# Patient Record
Sex: FEMALE | Race: WHITE | NOT HISPANIC OR LATINO | Employment: OTHER | URBAN - METROPOLITAN AREA
[De-identification: names, ages, dates, MRNs, and addresses within clinical notes are randomized per-mention and may not be internally consistent; named-entity substitution may affect disease eponyms.]

---

## 2023-03-06 DIAGNOSIS — B00.1 COLD SORE: ICD-10-CM

## 2023-03-06 RX ORDER — VALACYCLOVIR HYDROCHLORIDE 1 G/1
2 TABLET, FILM COATED ORAL EVERY 12 HOURS
COMMUNITY
Start: 2022-07-08 | End: 2023-03-06 | Stop reason: SDUPTHER

## 2023-03-06 RX ORDER — VALACYCLOVIR HYDROCHLORIDE 1 G/1
2000 TABLET, FILM COATED ORAL EVERY 12 HOURS
Qty: 12 TABLET | Refills: 1 | Status: SHIPPED | OUTPATIENT
Start: 2023-03-06 | End: 2023-04-26 | Stop reason: SDUPTHER

## 2023-04-04 PROBLEM — B00.1 COLD SORE: Status: ACTIVE | Noted: 2023-04-04

## 2023-04-04 PROBLEM — M06.9 RHEUMATOID ARTHRITIS (MULTI): Status: ACTIVE | Noted: 2023-04-04

## 2023-04-04 PROBLEM — I10 HYPERTENSION, ESSENTIAL: Status: ACTIVE | Noted: 2023-04-04

## 2023-04-04 PROBLEM — E78.5 DYSLIPIDEMIA: Status: ACTIVE | Noted: 2023-04-04

## 2023-04-04 PROBLEM — C85.90 LYMPHOMA (MULTI): Status: ACTIVE | Noted: 2023-04-04

## 2023-04-04 PROBLEM — R00.2 PALPITATIONS: Status: ACTIVE | Noted: 2023-04-04

## 2023-04-04 PROBLEM — E03.8 SUBCLINICAL HYPOTHYROIDISM: Status: ACTIVE | Noted: 2023-04-04

## 2023-04-18 ENCOUNTER — TELEPHONE (OUTPATIENT)
Dept: PRIMARY CARE | Facility: CLINIC | Age: 63
End: 2023-04-18
Payer: MEDICARE

## 2023-04-18 NOTE — TELEPHONE ENCOUNTER
Milla was treated for a URI the first of the month and was placed on Augmentin she started with diarrhea on the 12th and even went to [facility] on Sunday for the diarrhea.   They checked her for c  diff which was negative.  She still has the diarrhea and asking for something for that.

## 2023-04-25 RX ORDER — TRIAMCINOLONE ACETONIDE 1 MG/G
1 CREAM TOPICAL 2 TIMES DAILY
COMMUNITY
Start: 2023-01-12

## 2023-04-25 RX ORDER — METRONIDAZOLE 10 MG/G
1 GEL TOPICAL DAILY
COMMUNITY
Start: 2023-01-12

## 2023-04-26 ENCOUNTER — OFFICE VISIT (OUTPATIENT)
Dept: PRIMARY CARE | Facility: CLINIC | Age: 63
End: 2023-04-26
Payer: MEDICARE

## 2023-04-26 VITALS
BODY MASS INDEX: 25.52 KG/M2 | HEIGHT: 66 IN | HEART RATE: 80 BPM | WEIGHT: 158.8 LBS | DIASTOLIC BLOOD PRESSURE: 82 MMHG | SYSTOLIC BLOOD PRESSURE: 144 MMHG

## 2023-04-26 DIAGNOSIS — C85.90 NON-HODGKIN'S LYMPHOMA, UNSPECIFIED BODY REGION, UNSPECIFIED NON-HODGKIN LYMPHOMA TYPE (MULTI): ICD-10-CM

## 2023-04-26 DIAGNOSIS — B00.1 COLD SORE: ICD-10-CM

## 2023-04-26 DIAGNOSIS — I10 HYPERTENSION, ESSENTIAL: Primary | ICD-10-CM

## 2023-04-26 DIAGNOSIS — M06.9 RHEUMATOID ARTHRITIS, INVOLVING UNSPECIFIED SITE, UNSPECIFIED WHETHER RHEUMATOID FACTOR PRESENT (MULTI): ICD-10-CM

## 2023-04-26 DIAGNOSIS — E78.5 DYSLIPIDEMIA: ICD-10-CM

## 2023-04-26 DIAGNOSIS — E03.8 SUBCLINICAL HYPOTHYROIDISM: ICD-10-CM

## 2023-04-26 PROCEDURE — 3077F SYST BP >= 140 MM HG: CPT | Performed by: STUDENT IN AN ORGANIZED HEALTH CARE EDUCATION/TRAINING PROGRAM

## 2023-04-26 PROCEDURE — 99214 OFFICE O/P EST MOD 30 MIN: CPT | Performed by: STUDENT IN AN ORGANIZED HEALTH CARE EDUCATION/TRAINING PROGRAM

## 2023-04-26 PROCEDURE — 1036F TOBACCO NON-USER: CPT | Performed by: STUDENT IN AN ORGANIZED HEALTH CARE EDUCATION/TRAINING PROGRAM

## 2023-04-26 PROCEDURE — 3079F DIAST BP 80-89 MM HG: CPT | Performed by: STUDENT IN AN ORGANIZED HEALTH CARE EDUCATION/TRAINING PROGRAM

## 2023-04-26 RX ORDER — LOSARTAN POTASSIUM 50 MG/1
50 TABLET ORAL DAILY
Qty: 30 TABLET | Refills: 11 | Status: SHIPPED | OUTPATIENT
Start: 2023-04-26 | End: 2023-10-25 | Stop reason: ALTCHOICE

## 2023-04-26 RX ORDER — VALACYCLOVIR HYDROCHLORIDE 1 G/1
2000 TABLET, FILM COATED ORAL EVERY 12 HOURS
Qty: 12 TABLET | Refills: 1 | Status: SHIPPED | OUTPATIENT
Start: 2023-04-26 | End: 2023-07-25 | Stop reason: SDUPTHER

## 2023-04-26 NOTE — PROGRESS NOTES
"3 month check. Had an uri last month, urgent care given antibiotic. Caused diarrhea for 11 days. Still coughing some green phlegm in the morning.    Subjective   Patient ID: Milla Wild is a 62 y.o. female who presents for Hypertension, Hyperlipidemia, Rheumatoid Arthritis, and Lymphoma.    HPI  Regarding URI, evaluated at Urgent Care 4/6/2023 and provided with rx for Augmentin/Flonase/Tessalon. Unable to complete course of Augmentin due to significant diarrhea prompting ED eval. Tested for c diff twice (ED, oncology) - both negative. Just now starting to feel improved from UTI and diarrhea standpoint. Feeling like her energy and strength is starting to improve. Still struggling some with intermittently productive cough, but overall feeling better.    Regarding lymphoma, states that she just recently had updated scans at [facility]. Brain scan not done, but otherwise looked good per pt report. Next oncology eval scheduled for 7/13/2023.    Regarding HTN, SBP has been averaging 150s at home.    Review of Systems   Constitutional:  Negative for chills and fever.   Respiratory:  Positive for cough. Negative for shortness of breath.    Cardiovascular:  Negative for chest pain and palpitations.   Skin:  Negative for rash.   Psychiatric/Behavioral:  Negative for dysphoric mood. The patient is not nervous/anxious.      Objective   /82   Pulse 80   Ht 1.676 m (5' 6\")   Wt 72 kg (158 lb 12.8 oz)   BMI 25.63 kg/m²     Physical Exam  Constitutional:       Appearance: Normal appearance.   HENT:      Head: Normocephalic and atraumatic.   Eyes:      General: No scleral icterus.     Conjunctiva/sclera: Conjunctivae normal.   Cardiovascular:      Rate and Rhythm: Normal rate and regular rhythm.      Heart sounds: No murmur heard.  Pulmonary:      Effort: Pulmonary effort is normal. No respiratory distress.      Breath sounds: Normal breath sounds.   Musculoskeletal:         General: No swelling. Normal range of " motion.      Cervical back: Normal range of motion and neck supple.   Skin:     General: Skin is warm and dry.      Findings: No rash.   Neurological:      General: No focal deficit present.      Mental Status: She is alert.   Psychiatric:         Mood and Affect: Mood normal.         Behavior: Behavior normal.       Assessment/Plan   Problem List Items Addressed This Visit       Subclinical hypothyroidism     Labs prior to next appt.         Relevant Orders    TSH with reflex to Free T4 if abnormal    Rheumatoid arthritis (CMS/HCC)     Stable.         Non-Hodgkin's lymphoma (CMS/HCC)     Continue close follow up with oncology as previously scheduled.         Hypertension, essential - Primary     BP remains above goal. Will increase losartan to 50mg every day. Medication dosing and side effects reviewed. She will return in 2wks for nurse BP check and repeat BMP. Follow up in 6mo for recheck, sooner if needed. Annual labs prior to appt.         Relevant Medications    losartan (Cozaar) 50 mg tablet    Other Relevant Orders    Basic metabolic panel    CBC and Auto Differential    Comprehensive Metabolic Panel    Dyslipidemia     Labs prior to next appt.         Relevant Orders    Lipid Panel    Cold sore     Valtrex refill provided.         Relevant Medications    valACYclovir (Valtrex) 1 gram tablet

## 2023-05-01 PROBLEM — M85.80 OSTEOPENIA: Status: ACTIVE | Noted: 2023-05-01

## 2023-05-01 ASSESSMENT — ENCOUNTER SYMPTOMS
DYSPHORIC MOOD: 0
FEVER: 0
COUGH: 1
PALPITATIONS: 0
NERVOUS/ANXIOUS: 0
SHORTNESS OF BREATH: 0
CHILLS: 0

## 2023-05-01 NOTE — ASSESSMENT & PLAN NOTE
BP remains above goal. Will increase losartan to 50mg every day. Medication dosing and side effects reviewed. She will return in 2wks for nurse BP check and repeat BMP. Follow up in 6mo for recheck, sooner if needed. Annual labs prior to appt.

## 2023-05-17 ENCOUNTER — TELEPHONE (OUTPATIENT)
Dept: PRIMARY CARE | Facility: CLINIC | Age: 63
End: 2023-05-17
Payer: MEDICARE

## 2023-05-17 NOTE — TELEPHONE ENCOUNTER
Pt notified. Said she hasn't been taking it lately, has been like 116/60s-70s. No dizziness or anything.

## 2023-05-17 NOTE — TELEPHONE ENCOUNTER
----- Message from Laura Momin DO sent at 5/17/2023  1:02 PM EDT -----  Please let patient know that her kidney numbers still look great with the higher dose of losartan. How's her BP been at home? Thank you

## 2023-07-25 DIAGNOSIS — B00.1 COLD SORE: ICD-10-CM

## 2023-07-25 RX ORDER — VALACYCLOVIR HYDROCHLORIDE 1 G/1
2000 TABLET, FILM COATED ORAL EVERY 12 HOURS
Qty: 12 TABLET | Refills: 1 | Status: SHIPPED | OUTPATIENT
Start: 2023-07-25 | End: 2023-08-25 | Stop reason: SDUPTHER

## 2023-08-02 ENCOUNTER — OFFICE VISIT (OUTPATIENT)
Dept: PRIMARY CARE | Facility: CLINIC | Age: 63
End: 2023-08-02
Payer: MEDICARE

## 2023-08-02 VITALS
HEART RATE: 96 BPM | HEIGHT: 66 IN | BODY MASS INDEX: 25.63 KG/M2 | OXYGEN SATURATION: 99 % | SYSTOLIC BLOOD PRESSURE: 140 MMHG | DIASTOLIC BLOOD PRESSURE: 74 MMHG

## 2023-08-02 DIAGNOSIS — M54.50 BACK PAIN, LUMBOSACRAL: Primary | ICD-10-CM

## 2023-08-02 PROCEDURE — 99213 OFFICE O/P EST LOW 20 MIN: CPT | Performed by: INTERNAL MEDICINE

## 2023-08-02 PROCEDURE — 3077F SYST BP >= 140 MM HG: CPT | Performed by: INTERNAL MEDICINE

## 2023-08-02 PROCEDURE — 1036F TOBACCO NON-USER: CPT | Performed by: INTERNAL MEDICINE

## 2023-08-02 PROCEDURE — 3078F DIAST BP <80 MM HG: CPT | Performed by: INTERNAL MEDICINE

## 2023-08-02 RX ORDER — NAPROXEN 500 MG/1
500 TABLET ORAL
Qty: 12 TABLET | Refills: 0 | Status: SHIPPED | OUTPATIENT
Start: 2023-08-02 | End: 2023-10-25 | Stop reason: ALTCHOICE

## 2023-08-02 RX ORDER — BACLOFEN 10 MG/1
10 TABLET ORAL 3 TIMES DAILY
Qty: 30 TABLET | Refills: 1 | Status: SHIPPED | OUTPATIENT
Start: 2023-08-02 | End: 2023-10-25 | Stop reason: ALTCHOICE

## 2023-08-02 ASSESSMENT — ENCOUNTER SYMPTOMS
FEVER: 0
NAUSEA: 0
PALPITATIONS: 0
ARTHRALGIAS: 0
FATIGUE: 0
SHORTNESS OF BREATH: 0
COUGH: 0
BACK PAIN: 0
HEMATURIA: 0
DYSURIA: 0
FLANK PAIN: 0
BLOOD IN STOOL: 0
VOMITING: 0
DIARRHEA: 0
ABDOMINAL PAIN: 0
WHEEZING: 0

## 2023-08-02 NOTE — PROGRESS NOTES
"Subjective   Patient ID: Milla Wild is a 62 y.o. female who presents for No chief complaint on file..  HPI  I am seeing her during Dr. Momin's absence  She is here today for evaluation of acute left lower back pain.  She states that yesterday was simply turning she felt an abrupt onset of some \"stabbing pain \"in her back.  She states that she recalls having a very similar incident about 20 years ago and in that situation her stabbing pain was quite severe.  She states she recalls taking an anti-inflammatory and muscle relaxer and it did spontaneously resolve.  We did conduct a review of systems and she is having no systemic symptoms.  She also does not have any symptoms of numbness, tingling, or weakness of her lower extremity.  She has no symptoms of bowel or bladder incontinence.  On today's examination her deep tendon reflexes are brisk and symmetrical.  I decided to treat her with the anti-inflammatory and a muscle relaxer.  We talked about potential side effects and what to watch out for.  She will try this for 10 days and she knows to call if her condition is worsening as opposed to resolving.  Review of Systems   Constitutional:  Negative for fatigue and fever.   Respiratory:  Negative for cough, shortness of breath and wheezing.    Cardiovascular:  Negative for chest pain, palpitations and leg swelling.   Gastrointestinal:  Negative for abdominal pain, blood in stool, diarrhea, nausea and vomiting.   Genitourinary:  Negative for dysuria, flank pain and hematuria.   Musculoskeletal:  Negative for arthralgias and back pain.     Objective   Physical Exam  Vitals and nursing note reviewed.   Constitutional:       General: She is not in acute distress.     Appearance: Normal appearance.   HENT:      Head: Normocephalic and atraumatic.   Eyes:      Conjunctiva/sclera: Conjunctivae normal.   Cardiovascular:      Rate and Rhythm: Normal rate and regular rhythm.      Heart sounds: Normal heart sounds. "   Pulmonary:      Effort: No respiratory distress.      Breath sounds: No wheezing.   Abdominal:      Palpations: Abdomen is soft.      Tenderness: There is no abdominal tenderness. There is no guarding.   Musculoskeletal:         General: No swelling. Normal range of motion.   Skin:     General: Skin is warm and dry.   Neurological:      General: No focal deficit present.      Mental Status: She is alert and oriented to person, place, and time.   Psychiatric:         Behavior: Behavior normal.       Assessment/Plan   Problem List Items Addressed This Visit       Back pain, lumbosacral - Primary     -At this point we will try an anti-inflammatory i.e. Naprosyn 500 mg twice daily with food for the next 10 days  -I am prescribing baclofen 10 mg to be taken every 8 hours as needed for muscle spasm  -She will call if her condition is worsening or she develops any unusual symptoms that I will describe up in her discharge instruction         Relevant Medications    naproxen (Naprosyn) 500 mg tablet    baclofen (Lioresal) 10 mg tablet          Vika Vieyra,

## 2023-08-02 NOTE — PATIENT INSTRUCTIONS
"As we discussed I sent 2 prescriptions to your pharmacy.  1 is for Naprosyn 500 mg to be taken twice daily with food for the next 12 days.  Please remember to take it with food because it can cause stomach irritation.  If you feel you need additional pain relief it would be perfectly safe to take Tylenol also known as acetaminophen from over-the-counter as directed.  Tylenol can be taken concomitantly with Naprosyn if necessary  I also sent a prescription for baclofen which is a muscle relaxer.  You are permitted to take this every 6-8 hours as needed.  Some people reserve it for bedtime so that he can sleep more comfortably.  When you take baclofen please watch for symptoms of muscle weakness as this is a common side effect.  Please be careful about driving or operating heavy machinery while taking this medication.  Please also remember not to combine this medication with alcohol  Please call if your condition is not improving or does not resolve in the next few weeks.  If you develop any of the \"red flag symptoms \"that we discussed please call right away.  I do not anticipate that you will get any of the symptoms but if you develop weakness, fever, bowel or bladder dysfunction, or just plain worsening symptoms please call us as we would be happy to reassess you.  I will have you return to Dr. Momin as previously planned  "

## 2023-08-02 NOTE — ASSESSMENT & PLAN NOTE
-At this point we will try an anti-inflammatory i.e. Naprosyn 500 mg twice daily with food for the next 10 days  -I am prescribing baclofen 10 mg to be taken every 8 hours as needed for muscle spasm  -She will call if her condition is worsening or she develops any unusual symptoms that I will describe up in her discharge instruction

## 2023-08-25 DIAGNOSIS — B00.1 COLD SORE: ICD-10-CM

## 2023-08-25 RX ORDER — VALACYCLOVIR HYDROCHLORIDE 1 G/1
2000 TABLET, FILM COATED ORAL EVERY 12 HOURS
Qty: 12 TABLET | Refills: 1 | Status: SHIPPED | OUTPATIENT
Start: 2023-08-25 | End: 2023-10-25 | Stop reason: ALTCHOICE

## 2023-10-18 ENCOUNTER — TELEPHONE (OUTPATIENT)
Dept: PHARMACY | Facility: HOSPITAL | Age: 63
End: 2023-10-18
Payer: MEDICARE

## 2023-10-18 NOTE — TELEPHONE ENCOUNTER
Population Health: Outreach by Ambulatory Pharmacy Team    Payor: Donn AMARO  Reason: Adherence  Medication: Losartan 50 mg  Outcome: Patient reached, patient states they self discontinued the medication. The reason they were on the medication in the first place was due to a chemotherapy agent increasing their BP. Once agent was switched, BP began to be normal. Patient has a PCP appt next Thursday to discuss medications    EBONIE MURO CPhT

## 2023-10-20 NOTE — TELEPHONE ENCOUNTER
I reviewed the progress note and agree with the resident’s findings and plans as written. Case discussed with resident.    Mustapha Thayer, PharmD

## 2023-10-25 ENCOUNTER — OFFICE VISIT (OUTPATIENT)
Dept: PRIMARY CARE | Facility: CLINIC | Age: 63
End: 2023-10-25
Payer: MEDICARE

## 2023-10-25 VITALS
HEIGHT: 66 IN | SYSTOLIC BLOOD PRESSURE: 138 MMHG | DIASTOLIC BLOOD PRESSURE: 84 MMHG | WEIGHT: 141 LBS | BODY MASS INDEX: 22.66 KG/M2 | HEART RATE: 76 BPM

## 2023-10-25 DIAGNOSIS — Z23 ENCOUNTER FOR IMMUNIZATION: ICD-10-CM

## 2023-10-25 DIAGNOSIS — I10 HYPERTENSION, ESSENTIAL: ICD-10-CM

## 2023-10-25 DIAGNOSIS — J30.2 SEASONAL ALLERGIES: ICD-10-CM

## 2023-10-25 DIAGNOSIS — Z23 NEED FOR INFLUENZA VACCINATION: ICD-10-CM

## 2023-10-25 DIAGNOSIS — E03.8 SUBCLINICAL HYPOTHYROIDISM: ICD-10-CM

## 2023-10-25 DIAGNOSIS — C85.90 NON-HODGKIN'S LYMPHOMA, UNSPECIFIED BODY REGION, UNSPECIFIED NON-HODGKIN LYMPHOMA TYPE (MULTI): ICD-10-CM

## 2023-10-25 DIAGNOSIS — B00.1 COLD SORE: ICD-10-CM

## 2023-10-25 DIAGNOSIS — E78.5 DYSLIPIDEMIA: ICD-10-CM

## 2023-10-25 DIAGNOSIS — Z12.31 ENCOUNTER FOR SCREENING MAMMOGRAM FOR BREAST CANCER: Primary | ICD-10-CM

## 2023-10-25 PROCEDURE — 3075F SYST BP GE 130 - 139MM HG: CPT | Performed by: STUDENT IN AN ORGANIZED HEALTH CARE EDUCATION/TRAINING PROGRAM

## 2023-10-25 PROCEDURE — 3079F DIAST BP 80-89 MM HG: CPT | Performed by: STUDENT IN AN ORGANIZED HEALTH CARE EDUCATION/TRAINING PROGRAM

## 2023-10-25 PROCEDURE — 1036F TOBACCO NON-USER: CPT | Performed by: STUDENT IN AN ORGANIZED HEALTH CARE EDUCATION/TRAINING PROGRAM

## 2023-10-25 PROCEDURE — 90677 PCV20 VACCINE IM: CPT | Performed by: STUDENT IN AN ORGANIZED HEALTH CARE EDUCATION/TRAINING PROGRAM

## 2023-10-25 PROCEDURE — 99214 OFFICE O/P EST MOD 30 MIN: CPT | Performed by: STUDENT IN AN ORGANIZED HEALTH CARE EDUCATION/TRAINING PROGRAM

## 2023-10-25 PROCEDURE — G0008 ADMIN INFLUENZA VIRUS VAC: HCPCS | Performed by: STUDENT IN AN ORGANIZED HEALTH CARE EDUCATION/TRAINING PROGRAM

## 2023-10-25 PROCEDURE — 90686 IIV4 VACC NO PRSV 0.5 ML IM: CPT | Performed by: STUDENT IN AN ORGANIZED HEALTH CARE EDUCATION/TRAINING PROGRAM

## 2023-10-25 PROCEDURE — G0009 ADMIN PNEUMOCOCCAL VACCINE: HCPCS | Performed by: STUDENT IN AN ORGANIZED HEALTH CARE EDUCATION/TRAINING PROGRAM

## 2023-10-25 RX ORDER — MONTELUKAST SODIUM 10 MG/1
10 TABLET ORAL NIGHTLY
Qty: 30 TABLET | Refills: 5 | Status: SHIPPED | OUTPATIENT
Start: 2023-10-25 | End: 2024-05-05 | Stop reason: ALTCHOICE

## 2023-10-25 RX ORDER — VALACYCLOVIR HYDROCHLORIDE 1 G/1
1000 TABLET, FILM COATED ORAL DAILY
Qty: 90 TABLET | Refills: 1 | Status: SHIPPED | OUTPATIENT
Start: 2023-10-25 | End: 2024-01-23

## 2023-10-25 RX ORDER — FLUTICASONE PROPIONATE 50 MCG
1 SPRAY, SUSPENSION (ML) NASAL DAILY
Qty: 16 G | Refills: 5 | Status: SHIPPED | OUTPATIENT
Start: 2023-10-25 | End: 2024-05-05 | Stop reason: ALTCHOICE

## 2023-10-25 NOTE — PROGRESS NOTES
"Subjective   Patient ID: Milla Wild is a 62 y.o. female who presents for 6 month check with labs. Oncologist would like daily valcyclovir.      HPI  HTN - no longer taking losartan, has been really working on lifestyle kiran dietary modifications since last eval, BP decreased down into 110s/60s with transition to Calquence, states BP wnl at home, feeling well    Allergies - symptoms persistent/chronic, exacerbated early 9/2023 and now just about back to baseline aside from sinus pressure    Lymphoma - now following with Dr. GAVIN Mukherjee, transitioned from Imbruvica to Calquence due to FDA recall, has felt improved since the switch    Recurrent cold sores - daily suppressive antiviral recommended due to frequent recurrence    Cervical Cancer Screening: will consider  Breast Cancer Screening: due 11/2023  Osteoporosis Screening: due 1/2025  Colon Cancer Screening: Cologuard neg 6/7/2023, due 6/2026  Tobacco: quit 20yrs ago  Alcohol: rare  Recreational Drugs: denies  Immunizations: Prevnar and influenza today    Review of Systems   Constitutional:  Negative for chills and fever.   HENT:  Positive for sinus pressure.    Respiratory:  Negative for cough and shortness of breath.    Cardiovascular:  Negative for chest pain and palpitations.   Skin:  Negative for rash.   Psychiatric/Behavioral:  Negative for dysphoric mood. The patient is not nervous/anxious.      Objective   /84   Pulse 76   Ht 1.676 m (5' 6\")   Wt 64 kg (141 lb)   BMI 22.76 kg/m²     Physical Exam  Constitutional:       Appearance: Normal appearance.   HENT:      Head: Normocephalic and atraumatic.      Right Ear: Ear canal and external ear normal.      Left Ear: Ear canal and external ear normal.      Ears:      Comments: Bl serous effusion  Eyes:      General: No scleral icterus.     Conjunctiva/sclera: Conjunctivae normal.   Cardiovascular:      Rate and Rhythm: Normal rate and regular rhythm.      Heart sounds: No murmur heard.  Pulmonary:    "   Effort: Pulmonary effort is normal. No respiratory distress.      Breath sounds: Normal breath sounds.   Musculoskeletal:         General: No swelling. Normal range of motion.      Cervical back: Normal range of motion and neck supple.   Skin:     General: Skin is warm and dry.      Findings: No rash.   Neurological:      General: No focal deficit present.      Mental Status: She is alert.   Psychiatric:         Mood and Affect: Mood normal.         Behavior: Behavior normal.       Assessment/Plan   Problem List Items Addressed This Visit             ICD-10-CM    Subclinical hypothyroidism E03.8     TSH very minimally above normal range, but T4 remains within range and subjectively euthyroid. No indication for replacement.         Seasonal allergies J30.2     Chronic/persistent. Reviewed options. Using shared decision making, we decided to add nasal steroid and will trial Singulair. Medication dosing and side effects reviewed.          Relevant Medications    montelukast (Singulair) 10 mg tablet    fluticasone (Flonase) 50 mcg/actuation nasal spray    Other Relevant Orders    Follow Up In Primary Care - Medicare Annual    Non-Hodgkin's lymphoma (CMS/HCC) C85.90     Continue close follow up with oncology as previously scheduled.          Hypertension, essential I10     BP improved with dietary changes and transition from Imbruvica to Calquence. No longer taking antihypertensive. BP minimally above goal in office but wnl at home per pt report. She agrees to monitor and call if persistently >140/90. Return precautions reviewed.          Dyslipidemia E78.5     Reviewed FLP. No indication for statin trial at this time. Doing well with lifestyle/dietary modifications.         Cold sore B00.1     Will proceed with daily suppressive antiviral therapy due to frequent recurrence.         Relevant Medications    valACYclovir (Valtrex) 1 gram tablet    Other Relevant Orders    Follow Up In Primary Care - Medicare Annual      Other Visit Diagnoses         Codes    Encounter for screening mammogram for breast cancer    -  Primary Z12.31    Relevant Orders    BI mammo bilateral screening tomosynthesis    Follow Up In Primary Care - Medicare Annual    Need for influenza vaccination     Z23    Relevant Orders    Flu vaccine (IIV4) age 6 months and greater, preservative free (Completed)    Encounter for immunization     Z23    Relevant Orders    Pneumococcal conjugate vaccine, 20-valent (PREVNAR 20) (Completed)        Follow up in 6mo for recheck, sooner if needed.

## 2023-10-29 PROBLEM — M54.50 BACK PAIN, LUMBOSACRAL: Status: RESOLVED | Noted: 2023-08-02 | Resolved: 2023-10-29

## 2023-10-29 PROBLEM — J30.2 SEASONAL ALLERGIES: Status: ACTIVE | Noted: 2023-10-29

## 2023-10-29 ASSESSMENT — ENCOUNTER SYMPTOMS
FEVER: 0
CHILLS: 0
DYSPHORIC MOOD: 0
SINUS PRESSURE: 1
SHORTNESS OF BREATH: 0
COUGH: 0
PALPITATIONS: 0
NERVOUS/ANXIOUS: 0

## 2023-10-29 NOTE — ASSESSMENT & PLAN NOTE
Chronic/persistent. Reviewed options. Using shared decision making, we decided to add nasal steroid and will trial Singulair. Medication dosing and side effects reviewed.

## 2023-10-29 NOTE — ASSESSMENT & PLAN NOTE
Reviewed FLP. No indication for statin trial at this time. Doing well with lifestyle/dietary modifications.

## 2023-10-29 NOTE — ASSESSMENT & PLAN NOTE
BP improved with dietary changes and transition from Imbruvica to Calquence. No longer taking antihypertensive. BP minimally above goal in office but wnl at home per pt report. She agrees to monitor and call if persistently >140/90. Return precautions reviewed.

## 2023-10-29 NOTE — ASSESSMENT & PLAN NOTE
TSH very minimally above normal range, but T4 remains within range and subjectively euthyroid. No indication for replacement.

## 2024-02-08 ENCOUNTER — HOSPITAL ENCOUNTER (OUTPATIENT)
Dept: RADIOLOGY | Facility: CLINIC | Age: 64
Discharge: HOME | End: 2024-02-08
Payer: MEDICARE

## 2024-02-08 ENCOUNTER — APPOINTMENT (OUTPATIENT)
Dept: RADIOLOGY | Facility: HOSPITAL | Age: 64
End: 2024-02-08
Payer: MEDICARE

## 2024-02-08 DIAGNOSIS — Z12.31 ENCOUNTER FOR SCREENING MAMMOGRAM FOR BREAST CANCER: ICD-10-CM

## 2024-02-08 PROCEDURE — 77067 SCR MAMMO BI INCL CAD: CPT | Performed by: RADIOLOGY

## 2024-02-08 PROCEDURE — 77067 SCR MAMMO BI INCL CAD: CPT

## 2024-02-08 PROCEDURE — 77063 BREAST TOMOSYNTHESIS BI: CPT | Performed by: RADIOLOGY

## 2024-02-12 ENCOUNTER — TELEPHONE (OUTPATIENT)
Dept: PRIMARY CARE | Facility: CLINIC | Age: 64
End: 2024-02-12
Payer: MEDICARE

## 2024-02-12 NOTE — TELEPHONE ENCOUNTER
----- Message from Laura Momin, DO sent at 2/9/2024  2:00 PM EST -----  Please let patient know that her mammogram is normal. Will plan to repeat in 1yr. Thank you

## 2024-04-24 ENCOUNTER — OFFICE VISIT (OUTPATIENT)
Dept: PRIMARY CARE | Facility: CLINIC | Age: 64
End: 2024-04-24
Payer: MEDICARE

## 2024-04-24 VITALS
SYSTOLIC BLOOD PRESSURE: 138 MMHG | OXYGEN SATURATION: 100 % | DIASTOLIC BLOOD PRESSURE: 82 MMHG | WEIGHT: 148.8 LBS | BODY MASS INDEX: 24.02 KG/M2 | HEART RATE: 82 BPM

## 2024-04-24 DIAGNOSIS — M06.9 RHEUMATOID ARTHRITIS, INVOLVING UNSPECIFIED SITE, UNSPECIFIED WHETHER RHEUMATOID FACTOR PRESENT (MULTI): ICD-10-CM

## 2024-04-24 DIAGNOSIS — E78.5 DYSLIPIDEMIA: ICD-10-CM

## 2024-04-24 DIAGNOSIS — J30.2 SEASONAL ALLERGIES: ICD-10-CM

## 2024-04-24 DIAGNOSIS — Z00.00 ROUTINE GENERAL MEDICAL EXAMINATION AT HEALTH CARE FACILITY: Primary | ICD-10-CM

## 2024-04-24 DIAGNOSIS — D50.9 IRON DEFICIENCY ANEMIA, UNSPECIFIED IRON DEFICIENCY ANEMIA TYPE: ICD-10-CM

## 2024-04-24 DIAGNOSIS — I10 HYPERTENSION, ESSENTIAL: ICD-10-CM

## 2024-04-24 DIAGNOSIS — E03.8 SUBCLINICAL HYPOTHYROIDISM: ICD-10-CM

## 2024-04-24 PROCEDURE — 99213 OFFICE O/P EST LOW 20 MIN: CPT | Performed by: STUDENT IN AN ORGANIZED HEALTH CARE EDUCATION/TRAINING PROGRAM

## 2024-04-24 PROCEDURE — G0439 PPPS, SUBSEQ VISIT: HCPCS | Performed by: STUDENT IN AN ORGANIZED HEALTH CARE EDUCATION/TRAINING PROGRAM

## 2024-04-24 PROCEDURE — 3075F SYST BP GE 130 - 139MM HG: CPT | Performed by: STUDENT IN AN ORGANIZED HEALTH CARE EDUCATION/TRAINING PROGRAM

## 2024-04-24 PROCEDURE — 3079F DIAST BP 80-89 MM HG: CPT | Performed by: STUDENT IN AN ORGANIZED HEALTH CARE EDUCATION/TRAINING PROGRAM

## 2024-04-24 PROCEDURE — 1036F TOBACCO NON-USER: CPT | Performed by: STUDENT IN AN ORGANIZED HEALTH CARE EDUCATION/TRAINING PROGRAM

## 2024-04-24 ASSESSMENT — PATIENT HEALTH QUESTIONNAIRE - PHQ9
2. FEELING DOWN, DEPRESSED OR HOPELESS: NOT AT ALL
SUM OF ALL RESPONSES TO PHQ9 QUESTIONS 1 AND 2: 0
1. LITTLE INTEREST OR PLEASURE IN DOING THINGS: NOT AT ALL

## 2024-04-24 NOTE — PROGRESS NOTES
Subjective   Reason for Visit: Milla Wild is an 63 y.o. female here for a Medicare Wellness visit.     Past Medical, Surgical, and Family History reviewed and updated in chart.    Reviewed all medications by prescribing practitioner or clinical pharmacist (such as prescriptions, OTCs, herbal therapies and supplements) and documented in the medical record.    HPI  Mantle cell lymphoma - continues to follow closely with oncology (Dr. GAVIN Mukherjee), last eval 1/2024, managed on Calquence, scheduled for CT neck/chest/abdomen/pelvis tomorrow, she is feeling significantly improved since she opted out of ibrutinib clinical trial, cold sores have resolved and no longer needing Valtrex      Seasonal allergies - has no improvement with Singulair and Flonase, she is scheduled to see ENT for eval 5/8/2024    ROSENDO - follows with hemeonc as above, feeling subjectively improved s/p iron infusion, she was encouraged to consider colonoscopy    Cervical Cancer Screening: she will consider  Breast Cancer Screening: due 2/2025  Osteoporosis Screening: due 1/2025  Colon Cancer Screening: Cologuard neg 6/7/2023, due 6/2026, she is considering colonoscopy for workup of ROSENDO  Tobacco: quit 20yrs ago  Alcohol: rare  Recreational Drugs: denies  Immunizations: due for Shingrix and TDaP, otherwise utd    Patient Care Team:  Laura Momin DO as PCP - General  Laura Momin DO as PCP - [employer] PCP       Living Will: Not Received    Healthcare Power of Atty: Not Received     Advance directives: Advanced Care Planning discussed and documented. Advance care plan or surrogate decision maker documented in the medical record. Patient does not have living will. Patient does not have healthcare POA.      Review of Systems   Constitutional:  Negative for chills and fever.   Respiratory:  Negative for cough and shortness of breath.    Cardiovascular:  Negative for chest pain and palpitations.   Skin:  Negative for rash.    Psychiatric/Behavioral:  Negative for dysphoric mood. The patient is not nervous/anxious.      Objective   Vitals:  /82   Pulse 82   Wt 67.5 kg (148 lb 12.8 oz)   SpO2 100%   BMI 24.02 kg/m²       Physical Exam  Constitutional:       Appearance: Normal appearance.   HENT:      Head: Normocephalic and atraumatic.   Eyes:      General: No scleral icterus.     Conjunctiva/sclera: Conjunctivae normal.   Cardiovascular:      Rate and Rhythm: Normal rate and regular rhythm.      Heart sounds: No murmur heard.  Pulmonary:      Effort: Pulmonary effort is normal. No respiratory distress.      Breath sounds: Normal breath sounds.   Musculoskeletal:         General: No swelling. Normal range of motion.      Cervical back: Normal range of motion and neck supple.   Skin:     General: Skin is warm and dry.      Findings: No rash.   Neurological:      General: No focal deficit present.      Mental Status: She is alert.   Psychiatric:         Mood and Affect: Mood normal.         Behavior: Behavior normal.       Assessment/Plan   Problem List Items Addressed This Visit       Subclinical hypothyroidism    Current Assessment & Plan     Will repeat labs prior to next eval.         Relevant Orders    TSH with reflex to Free T4 if abnormal    Follow Up In Primary Care - Established    Seasonal allergies    Current Assessment & Plan     No improvement with Singulair or nasal steroid. She will be seeing ENT for further eval next month.         Relevant Orders    Follow Up In Primary Care - Established    Rheumatoid arthritis (Multi)    Current Assessment & Plan     Stable.         ROSENDO (iron deficiency anemia)    Overview     Following with hematology, Dr. GAVIN Mukherjee         Current Assessment & Plan     Improved s/p iron infusion. She is considering colonoscopy.         Hypertension, essential    Overview     Unable to tolerate lisinopril         Current Assessment & Plan     BP at goal off all antihypertensives.          Dyslipidemia    Current Assessment & Plan     Will update labs prior to next eval.         Relevant Orders    Lipid Panel    Follow Up In Primary Care - Established     Other Visit Diagnoses       Routine general medical examination at health care facility    -  Primary    Relevant Orders    Follow Up In Primary Care - Established        Follow up in 6mo for recheck, sooner if needed.

## 2024-05-05 PROBLEM — B00.1 COLD SORE: Status: RESOLVED | Noted: 2023-04-04 | Resolved: 2024-05-05

## 2024-05-05 PROBLEM — D50.9 IDA (IRON DEFICIENCY ANEMIA): Status: ACTIVE | Noted: 2024-05-05

## 2024-05-05 ASSESSMENT — PATIENT HEALTH QUESTIONNAIRE - PHQ9
SUM OF ALL RESPONSES TO PHQ9 QUESTIONS 1 AND 2: 0
2. FEELING DOWN, DEPRESSED OR HOPELESS: NOT AT ALL
1. LITTLE INTEREST OR PLEASURE IN DOING THINGS: NOT AT ALL

## 2024-05-05 ASSESSMENT — ENCOUNTER SYMPTOMS
NERVOUS/ANXIOUS: 0
DYSPHORIC MOOD: 0
PALPITATIONS: 0
COUGH: 0
CHILLS: 0
FEVER: 0
SHORTNESS OF BREATH: 0

## 2024-05-05 ASSESSMENT — ACTIVITIES OF DAILY LIVING (ADL)
MANAGING_FINANCES: INDEPENDENT
BATHING: INDEPENDENT
DRESSING: INDEPENDENT
DOING_HOUSEWORK: INDEPENDENT
GROCERY_SHOPPING: INDEPENDENT
TAKING_MEDICATION: INDEPENDENT

## 2024-05-06 NOTE — ASSESSMENT & PLAN NOTE
BP at goal off all antihypertensives.  
Feeling improved since opting out of ibrutinib clinical trial. Managed now on Calquence. Continue close follow up with oncology as previously scheduled. Scheduled for scans tomorrow.  
Improved s/p iron infusion. She is considering colonoscopy.  
No improvement with Singulair or nasal steroid. She will be seeing ENT for further eval next month.  
Stable.  
Will repeat labs prior to next eval.  
Will update labs prior to next eval.  
no

## 2024-09-27 ENCOUNTER — TELEPHONE (OUTPATIENT)
Dept: PRIMARY CARE | Facility: CLINIC | Age: 64
End: 2024-09-27

## 2024-09-30 ASSESSMENT — ENCOUNTER SYMPTOMS
HEADACHES: 1
SHORTNESS OF BREATH: 1
LEG PAIN: 0
SWOLLEN GLANDS: 0
HEMOPTYSIS: 0
CLAUDICATION: 0
NECK PAIN: 0
PND: 0
RHINORRHEA: 1
VOMITING: 0
SPUTUM PRODUCTION: 0
SORE THROAT: 0
WHEEZING: 0
SYNCOPE: 0
FEVER: 0
ABDOMINAL PAIN: 0
ORTHOPNEA: 0

## 2024-10-03 ENCOUNTER — OFFICE VISIT (OUTPATIENT)
Dept: PRIMARY CARE | Facility: CLINIC | Age: 64
End: 2024-10-03
Payer: MEDICARE

## 2024-10-03 VITALS
DIASTOLIC BLOOD PRESSURE: 80 MMHG | SYSTOLIC BLOOD PRESSURE: 134 MMHG | WEIGHT: 150.2 LBS | OXYGEN SATURATION: 96 % | BODY MASS INDEX: 24.14 KG/M2 | HEART RATE: 63 BPM | HEIGHT: 66 IN

## 2024-10-03 DIAGNOSIS — J18.9 PNEUMONIA OF RIGHT MIDDLE LOBE DUE TO INFECTIOUS ORGANISM: Primary | ICD-10-CM

## 2024-10-03 DIAGNOSIS — R21 RASH: ICD-10-CM

## 2024-10-03 PROCEDURE — 1036F TOBACCO NON-USER: CPT | Performed by: STUDENT IN AN ORGANIZED HEALTH CARE EDUCATION/TRAINING PROGRAM

## 2024-10-03 PROCEDURE — 99213 OFFICE O/P EST LOW 20 MIN: CPT | Performed by: STUDENT IN AN ORGANIZED HEALTH CARE EDUCATION/TRAINING PROGRAM

## 2024-10-03 PROCEDURE — 3075F SYST BP GE 130 - 139MM HG: CPT | Performed by: STUDENT IN AN ORGANIZED HEALTH CARE EDUCATION/TRAINING PROGRAM

## 2024-10-03 PROCEDURE — 3079F DIAST BP 80-89 MM HG: CPT | Performed by: STUDENT IN AN ORGANIZED HEALTH CARE EDUCATION/TRAINING PROGRAM

## 2024-10-03 PROCEDURE — 3008F BODY MASS INDEX DOCD: CPT | Performed by: STUDENT IN AN ORGANIZED HEALTH CARE EDUCATION/TRAINING PROGRAM

## 2024-10-03 RX ORDER — DOXYCYCLINE HYCLATE 100 MG
100 TABLET ORAL 2 TIMES DAILY
COMMUNITY
Start: 2024-09-27 | End: 2024-10-07

## 2024-10-03 RX ORDER — MINERAL OIL
180 ENEMA (ML) RECTAL
COMMUNITY
Start: 2024-09-30 | End: 2025-01-28

## 2024-10-03 RX ORDER — TRIAMCINOLONE ACETONIDE 1 MG/G
1 CREAM TOPICAL 2 TIMES DAILY
Qty: 15 G | Refills: 1 | Status: SHIPPED | OUTPATIENT
Start: 2024-10-03

## 2024-10-03 RX ORDER — GUAIFENESIN 600 MG/1
600 TABLET, EXTENDED RELEASE ORAL 2 TIMES DAILY PRN
Qty: 60 TABLET | Refills: 1 | Status: SHIPPED | OUTPATIENT
Start: 2024-10-03 | End: 2025-10-03

## 2024-10-03 NOTE — PROGRESS NOTES
"Subjective   Patient ID: Milla Wild is a 63 y.o. female who presents for follow up pneumonia. SOB has gotten better. Did have an episode today.     HPI  Presenting today for ER follow up. She states that symptoms initially started about a month ago. Symptoms were present when she saw her allergist. Was rx antibiotic but did not take due to possible side effects. States that she was taking Mucinex at the time. Cough improved significantly, but she then developed SOB. Went to the ED for evaluation 9/27/24 due to episode of SOB and LH when she stood from seated position. Chest CTA neg for PE but did reveal \"questionable patchy infiltrate in the RUL and increased density in the RLL, concerning for resolving inflammatory changes particularly in the RLL\". She was discharged with rx for doxycycline. Feeling much improved. Did have a episode of chest tightness and SOB today, but much less severe than the episode that prompted ED evaluation. States that her cough is minimal. Does note that she was at her daughter's house today when she had the episode of SOB. Her daughter has 2 dogs and pt is allergic. She is overall feeling much improved.    Review of Systems   Constitutional:  Negative for chills and fever.   Respiratory:  Positive for shortness of breath. Negative for cough.    Cardiovascular:  Negative for chest pain and palpitations.   Skin:  Negative for rash.   Psychiatric/Behavioral:  Negative for dysphoric mood. The patient is not nervous/anxious.      Objective   /80   Pulse 63   Ht 1.676 m (5' 6\")   Wt 68.1 kg (150 lb 3.2 oz)   SpO2 96%   BMI 24.24 kg/m²     Physical Exam  Constitutional:       Appearance: Normal appearance.   HENT:      Head: Normocephalic and atraumatic.   Eyes:      General: No scleral icterus.     Conjunctiva/sclera: Conjunctivae normal.   Cardiovascular:      Rate and Rhythm: Normal rate and regular rhythm.      Heart sounds: No murmur heard.  Pulmonary:      Effort: " Pulmonary effort is normal. No respiratory distress.      Breath sounds: Normal breath sounds.   Musculoskeletal:         General: No swelling. Normal range of motion.      Cervical back: Normal range of motion and neck supple.   Skin:     General: Skin is warm and dry.      Findings: No rash.   Neurological:      General: No focal deficit present.      Mental Status: She is alert.   Psychiatric:         Mood and Affect: Mood normal.         Behavior: Behavior normal.       Assessment/Plan   Problem List Items Addressed This Visit             ICD-10-CM    Rash R21     Intermittent rash on cheeks improves with topical steroid. Will refill.         Relevant Medications    triamcinolone (Kenalog) 0.1 % cream    Other Relevant Orders    Follow Up In Primary Care - Established    Pneumonia of right middle lobe due to infectious organism - Primary J18.9     ED evaluation reviewed. Clinically improving with doxycycline which she is tolerating. Encouraged her to continue to completion. Okay to continue Mucinex. Medication dosing and side effects reviewed. Return precautions reviewed.          Relevant Medications    guaiFENesin (Mucinex) 600 mg 12 hr tablet    Other Relevant Orders    Follow Up In Primary Care - Established   Follow up in 3mo for recheck, sooner if needed.

## 2024-10-06 PROBLEM — J18.9 PNEUMONIA OF RIGHT MIDDLE LOBE DUE TO INFECTIOUS ORGANISM: Status: ACTIVE | Noted: 2024-10-06

## 2024-10-06 PROBLEM — R21 RASH: Status: ACTIVE | Noted: 2024-10-06

## 2024-10-06 ASSESSMENT — ENCOUNTER SYMPTOMS
SHORTNESS OF BREATH: 1
PALPITATIONS: 0
COUGH: 0
DYSPHORIC MOOD: 0
CHILLS: 0
FEVER: 0
NERVOUS/ANXIOUS: 0

## 2024-10-06 NOTE — ASSESSMENT & PLAN NOTE
ED evaluation reviewed. Clinically improving with doxycycline which she is tolerating. Encouraged her to continue to completion. Okay to continue Mucinex. Medication dosing and side effects reviewed. Return precautions reviewed.

## 2024-10-24 ENCOUNTER — APPOINTMENT (OUTPATIENT)
Dept: PRIMARY CARE | Facility: CLINIC | Age: 64
End: 2024-10-24
Payer: MEDICARE

## 2024-11-11 DIAGNOSIS — B00.1 COLD SORE: ICD-10-CM

## 2024-11-11 RX ORDER — VALACYCLOVIR HYDROCHLORIDE 1 G/1
2000 TABLET, FILM COATED ORAL EVERY 12 HOURS
Qty: 12 TABLET | Refills: 1 | Status: SHIPPED | OUTPATIENT
Start: 2024-11-11

## 2024-12-02 DIAGNOSIS — B00.1 COLD SORE: ICD-10-CM

## 2024-12-02 RX ORDER — VALACYCLOVIR HYDROCHLORIDE 1 G/1
1000 TABLET, FILM COATED ORAL DAILY
Qty: 90 TABLET | Refills: 1 | Status: SHIPPED | OUTPATIENT
Start: 2024-12-02 | End: 2025-03-02

## 2025-01-06 ENCOUNTER — APPOINTMENT (OUTPATIENT)
Dept: PRIMARY CARE | Facility: CLINIC | Age: 65
End: 2025-01-06
Payer: MEDICARE

## 2025-01-06 ASSESSMENT — ENCOUNTER SYMPTOMS
COUGH: 0
ANOREXIA: 0
FATIGUE: 0
NAIL CHANGES: 0
DIARRHEA: 0
SHORTNESS OF BREATH: 0
SORE THROAT: 0
FEVER: 0
RHINORRHEA: 0
VOMITING: 0
EYE PAIN: 0

## 2025-01-20 ENCOUNTER — APPOINTMENT (OUTPATIENT)
Dept: PRIMARY CARE | Facility: CLINIC | Age: 65
End: 2025-01-20
Payer: MEDICARE

## 2025-01-20 VITALS
DIASTOLIC BLOOD PRESSURE: 84 MMHG | HEART RATE: 88 BPM | SYSTOLIC BLOOD PRESSURE: 132 MMHG | BODY MASS INDEX: 26.9 KG/M2 | HEIGHT: 66 IN | WEIGHT: 167.4 LBS

## 2025-01-20 DIAGNOSIS — Z78.0 MENOPAUSE PRESENT: ICD-10-CM

## 2025-01-20 DIAGNOSIS — R21 RASH: Primary | ICD-10-CM

## 2025-01-20 DIAGNOSIS — E78.5 DYSLIPIDEMIA: ICD-10-CM

## 2025-01-20 DIAGNOSIS — E03.8 SUBCLINICAL HYPOTHYROIDISM: ICD-10-CM

## 2025-01-20 DIAGNOSIS — C85.90 NON-HODGKIN'S LYMPHOMA, UNSPECIFIED BODY REGION, UNSPECIFIED NON-HODGKIN LYMPHOMA TYPE: ICD-10-CM

## 2025-01-20 DIAGNOSIS — Z12.31 ENCOUNTER FOR SCREENING MAMMOGRAM FOR BREAST CANCER: ICD-10-CM

## 2025-01-20 PROCEDURE — 3075F SYST BP GE 130 - 139MM HG: CPT | Performed by: STUDENT IN AN ORGANIZED HEALTH CARE EDUCATION/TRAINING PROGRAM

## 2025-01-20 PROCEDURE — G2211 COMPLEX E/M VISIT ADD ON: HCPCS | Performed by: STUDENT IN AN ORGANIZED HEALTH CARE EDUCATION/TRAINING PROGRAM

## 2025-01-20 PROCEDURE — 3008F BODY MASS INDEX DOCD: CPT | Performed by: STUDENT IN AN ORGANIZED HEALTH CARE EDUCATION/TRAINING PROGRAM

## 2025-01-20 PROCEDURE — 3079F DIAST BP 80-89 MM HG: CPT | Performed by: STUDENT IN AN ORGANIZED HEALTH CARE EDUCATION/TRAINING PROGRAM

## 2025-01-20 PROCEDURE — 1036F TOBACCO NON-USER: CPT | Performed by: STUDENT IN AN ORGANIZED HEALTH CARE EDUCATION/TRAINING PROGRAM

## 2025-01-20 PROCEDURE — 99214 OFFICE O/P EST MOD 30 MIN: CPT | Performed by: STUDENT IN AN ORGANIZED HEALTH CARE EDUCATION/TRAINING PROGRAM

## 2025-01-20 RX ORDER — OMEPRAZOLE 20 MG/1
20 CAPSULE, DELAYED RELEASE ORAL
COMMUNITY

## 2025-01-20 RX ORDER — FAMOTIDINE 40 MG/1
40 TABLET, FILM COATED ORAL NIGHTLY
COMMUNITY
Start: 2024-11-19 | End: 2025-05-18

## 2025-01-20 RX ORDER — TRIAMCINOLONE ACETONIDE 1 MG/G
1 CREAM TOPICAL 2 TIMES DAILY
Qty: 15 G | Refills: 1 | Status: SHIPPED | OUTPATIENT
Start: 2025-01-20

## 2025-01-20 ASSESSMENT — PATIENT HEALTH QUESTIONNAIRE - PHQ9
1. LITTLE INTEREST OR PLEASURE IN DOING THINGS: NOT AT ALL
2. FEELING DOWN, DEPRESSED OR HOPELESS: NOT AT ALL
SUM OF ALL RESPONSES TO PHQ9 QUESTIONS 1 AND 2: 0

## 2025-01-20 NOTE — PROGRESS NOTES
"Subjective   Patient ID: Milla Wild is a 64 y.o. female who presents for 3 month check     HPI  HTN - BP just about at goal, she is overall feeling well aside from facial rash, denies CP, SOB, palpitations, HA, LH, abd pain, n/v/d, she has recently been focusing on lifestyle modifications, has been working on modifying her diet and also started exercising last week    Rash - chronic and intermittent but worse over the past 3mo, notes diffuse redness with pustules, worse around eyes, gets itchy, states that some spots look like blisters at times, she has tried her topical triamcinolone and metrogel without much improvement, the triamcinolone helps a little with new lesions along jaw line, she denies any new known exposures    Cervical Cancer Screening: she will consider  Breast Cancer Screening: due 2/2025  Osteoporosis Screening: due  Colon Cancer Screening: Cologuard neg 6/7/2023, due 6/2026  Tobacco: quit 20yrs ago  Alcohol: rare  Recreational Drugs: denies  Immunizations: due for Shingrix and TDaP, otherwise utd    Review of Systems   Constitutional:  Negative for chills and fever.   Respiratory:  Negative for cough and shortness of breath.    Cardiovascular:  Negative for chest pain and palpitations.   Gastrointestinal:  Negative for abdominal pain, constipation, diarrhea, nausea and vomiting.   Genitourinary:  Negative for dysuria.   Skin:  Positive for rash.   Psychiatric/Behavioral:  Negative for dysphoric mood. The patient is not nervous/anxious.      Objective   /84   Pulse 88   Ht 1.676 m (5' 6\")   Wt 75.9 kg (167 lb 6.4 oz)   BMI 27.02 kg/m²     Physical Exam  Constitutional:       Appearance: Normal appearance.   HENT:      Head: Normocephalic and atraumatic.   Eyes:      General: No scleral icterus.     Conjunctiva/sclera: Conjunctivae normal.   Cardiovascular:      Rate and Rhythm: Normal rate and regular rhythm.      Heart sounds: No murmur heard.  Pulmonary:      Effort: Pulmonary " effort is normal. No respiratory distress.      Breath sounds: Normal breath sounds.   Musculoskeletal:         General: No swelling. Normal range of motion.      Cervical back: Normal range of motion and neck supple.   Skin:     General: Skin is warm and dry.      Findings: Rash (erythematous and slighly edematous surrounding eyes, nose, and mouth, there are numerous small pustules diffusely to include nose, cheeks, forehead, chin, jawline) present.   Neurological:      General: No focal deficit present.      Mental Status: She is alert.   Psychiatric:         Mood and Affect: Mood normal.         Behavior: Behavior normal.       Assessment/Plan   Problem List Items Addressed This Visit             ICD-10-CM    Subclinical hypothyroidism E03.8     Will update TSH with next oncology lab draw.         Relevant Orders    TSH with reflex to Free T4 if abnormal    Follow Up In Primary Care - Medicare Annual    Rash - Primary R21     Erythematous, pustular rash on face. Flaring x3mo. Will proceed with dermatology referral.         Relevant Medications    triamcinolone (Kenalog) 0.1 % cream    Other Relevant Orders    Referral to Dermatology    Follow Up In Primary Care - Medicare Annual    Non-Hodgkin's lymphoma C85.90     Continue close follow up with oncology as previously scheduled.          Dyslipidemia E78.5     Will update FLP with next oncology lab draw.         Relevant Orders    Lipid Panel    Follow Up In Primary Care - Medicare Annual     Other Visit Diagnoses         Codes    Menopause present     Z78.0    Relevant Orders    XR DEXA bone density    Follow Up In Primary Care - Medicare Annual    Encounter for screening mammogram for breast cancer     Z12.31    Relevant Orders    BI mammo bilateral screening tomosynthesis    Follow Up In Primary Care - Medicare Annual        Follow up in 6mo for recheck, sooner if needed.

## 2025-01-21 PROBLEM — J18.9 PNEUMONIA OF RIGHT MIDDLE LOBE DUE TO INFECTIOUS ORGANISM: Status: RESOLVED | Noted: 2024-10-06 | Resolved: 2025-01-21

## 2025-01-21 ASSESSMENT — ENCOUNTER SYMPTOMS
CONSTIPATION: 0
DIARRHEA: 0
DYSURIA: 0
SHORTNESS OF BREATH: 0
NERVOUS/ANXIOUS: 0
FEVER: 0
PALPITATIONS: 0
DYSPHORIC MOOD: 0
ABDOMINAL PAIN: 0
NAUSEA: 0
CHILLS: 0
VOMITING: 0
COUGH: 0

## 2025-05-01 ENCOUNTER — TELEPHONE (OUTPATIENT)
Dept: PRIMARY CARE | Facility: CLINIC | Age: 65
End: 2025-05-01
Payer: MEDICARE

## 2025-05-01 NOTE — TELEPHONE ENCOUNTER
----- Message from Laura Momin sent at 3/28/2025  3:29 PM EDT -----  Please let Milla know that her mammogram and bone density are both stable. Bone density remains in osteopenia range. Recommendation is that we repeat mammogram in 1yr and bone density in 2yrs. Thank you

## 2025-06-23 ENCOUNTER — TELEPHONE (OUTPATIENT)
Dept: PRIMARY CARE | Facility: CLINIC | Age: 65
End: 2025-06-23
Payer: MEDICARE

## 2025-06-23 NOTE — TELEPHONE ENCOUNTER
She did the 2 tablets twice for a day for fever blisters and she is still having issues. Asking what you would recommend?

## 2025-06-30 ENCOUNTER — TELEPHONE (OUTPATIENT)
Dept: PRIMARY CARE | Facility: CLINIC | Age: 65
End: 2025-06-30
Payer: MEDICARE

## 2025-06-30 DIAGNOSIS — L23.7 POISON IVY DERMATITIS: Primary | ICD-10-CM

## 2025-06-30 RX ORDER — METHYLPREDNISOLONE 4 MG/1
TABLET ORAL
Qty: 21 TABLET | Refills: 0 | Status: SHIPPED | OUTPATIENT
Start: 2025-06-30 | End: 2025-07-06

## 2025-06-30 NOTE — TELEPHONE ENCOUNTER
Pt LVM asking if you would be able to send in a prescription for poison ivy. States that she has it on her face and it is getting close to her left eye. [facility]. Please advise, thank you.

## 2025-07-08 ENCOUNTER — TELEPHONE (OUTPATIENT)
Dept: PRIMARY CARE | Facility: CLINIC | Age: 65
End: 2025-07-08
Payer: MEDICARE

## 2025-07-08 NOTE — TELEPHONE ENCOUNTER
Pt LVM stating that you recently treated her for poison ivy, she has completed the prescription of prednisone but said she still has a large rash on her hip and stomach. She would like to know what you could recommend for her. Please advise, Thank you.

## 2025-07-09 DIAGNOSIS — L23.7 POISON IVY DERMATITIS: ICD-10-CM

## 2025-07-09 RX ORDER — METHYLPREDNISOLONE 4 MG/1
TABLET ORAL
Qty: 21 TABLET | Refills: 0 | Status: SHIPPED | OUTPATIENT
Start: 2025-07-09 | End: 2025-07-15

## 2025-07-24 ENCOUNTER — APPOINTMENT (OUTPATIENT)
Dept: PRIMARY CARE | Facility: CLINIC | Age: 65
End: 2025-07-24
Payer: MEDICARE

## 2025-07-24 VITALS
HEART RATE: 86 BPM | BODY MASS INDEX: 25.97 KG/M2 | DIASTOLIC BLOOD PRESSURE: 84 MMHG | SYSTOLIC BLOOD PRESSURE: 122 MMHG | HEIGHT: 66 IN | WEIGHT: 161.6 LBS

## 2025-07-24 DIAGNOSIS — E03.8 SUBCLINICAL HYPOTHYROIDISM: ICD-10-CM

## 2025-07-24 DIAGNOSIS — C85.90 NON-HODGKIN'S LYMPHOMA, UNSPECIFIED BODY REGION, UNSPECIFIED NON-HODGKIN LYMPHOMA TYPE: ICD-10-CM

## 2025-07-24 DIAGNOSIS — M06.9 RHEUMATOID ARTHRITIS, INVOLVING UNSPECIFIED SITE, UNSPECIFIED WHETHER RHEUMATOID FACTOR PRESENT (MULTI): ICD-10-CM

## 2025-07-24 DIAGNOSIS — R21 RASH: ICD-10-CM

## 2025-07-24 DIAGNOSIS — E78.5 DYSLIPIDEMIA: ICD-10-CM

## 2025-07-24 DIAGNOSIS — Z00.00 ROUTINE GENERAL MEDICAL EXAMINATION AT HEALTH CARE FACILITY: Primary | ICD-10-CM

## 2025-07-24 LAB
CHOLEST SERPL-MCNC: 248 MG/DL
CHOLEST/HDLC SERPL: 3 (CALC)
HDLC SERPL-MCNC: 83 MG/DL
LDLC SERPL CALC-MCNC: 140 MG/DL (CALC)
NONHDLC SERPL-MCNC: 165 MG/DL (CALC)
T4 FREE SERPL-MCNC: 1 NG/DL (ref 0.8–1.8)
TRIGL SERPL-MCNC: 125 MG/DL
TSH SERPL-ACNC: 4.6 MIU/L (ref 0.4–4.5)

## 2025-07-24 PROCEDURE — 3074F SYST BP LT 130 MM HG: CPT | Performed by: STUDENT IN AN ORGANIZED HEALTH CARE EDUCATION/TRAINING PROGRAM

## 2025-07-24 PROCEDURE — 3079F DIAST BP 80-89 MM HG: CPT | Performed by: STUDENT IN AN ORGANIZED HEALTH CARE EDUCATION/TRAINING PROGRAM

## 2025-07-24 PROCEDURE — G0439 PPPS, SUBSEQ VISIT: HCPCS | Performed by: STUDENT IN AN ORGANIZED HEALTH CARE EDUCATION/TRAINING PROGRAM

## 2025-07-24 PROCEDURE — 3008F BODY MASS INDEX DOCD: CPT | Performed by: STUDENT IN AN ORGANIZED HEALTH CARE EDUCATION/TRAINING PROGRAM

## 2025-07-24 PROCEDURE — 99213 OFFICE O/P EST LOW 20 MIN: CPT | Performed by: STUDENT IN AN ORGANIZED HEALTH CARE EDUCATION/TRAINING PROGRAM

## 2025-07-24 RX ORDER — METRONIDAZOLE 7.5 MG/G
CREAM TOPICAL 2 TIMES DAILY
Qty: 45 G | Refills: 2 | Status: SHIPPED | OUTPATIENT
Start: 2025-07-24 | End: 2026-07-24

## 2025-07-24 ASSESSMENT — PATIENT HEALTH QUESTIONNAIRE - PHQ9
SUM OF ALL RESPONSES TO PHQ9 QUESTIONS 1 AND 2: 0
1. LITTLE INTEREST OR PLEASURE IN DOING THINGS: NOT AT ALL
2. FEELING DOWN, DEPRESSED OR HOPELESS: NOT AT ALL

## 2025-07-24 ASSESSMENT — ENCOUNTER SYMPTOMS
DEPRESSION: 0
LOSS OF SENSATION IN FEET: 0
OCCASIONAL FEELINGS OF UNSTEADINESS: 0

## 2025-07-24 ASSESSMENT — ACTIVITIES OF DAILY LIVING (ADL)
DRESSING: INDEPENDENT
BATHING: INDEPENDENT

## 2025-07-24 NOTE — ASSESSMENT & PLAN NOTE
- Dietary modifications and recommendation for 150 minutes of moderate-intensity exercise per week reviewed.   - She declines rx tx  - We will continue to monitor   Orders:    Follow Up In Primary Care - Medicare Annual    Follow Up In Primary Care - Established; Future    Lipid Panel; Future

## 2025-07-24 NOTE — PROGRESS NOTES
"Subjective   Reason for Visit: Milla Wild is an 64 y.o. female here for a Medicare Wellness visit.     Past Medical, Surgical, and Family History reviewed and updated in chart.    Reviewed all medications by prescribing practitioner or clinical pharmacist (such as prescriptions, OTCs, herbal therapies and supplements) and documented in the medical record.    HPI  Presenting today for routine follow up. She overall feels well and is enjoying her summer. She continues to follow closely with oncology. FLP recently done and revealed increase in LDL from 89 to 140. She prefers to try to control with diet as opposed to trying a medication. She tries to remain active. She is no longer doing allergy shots and managing with prn Benadryl. Overall well-controlled.    Cervical Cancer Screening: she declines  Breast Cancer Screening: due 3/2026  Osteoporosis Screening: due 3/2027, declines rx tx  Colon Cancer Screening: Cologuard neg 6/7/2023, due 6/2026  Tobacco: quit 20+yrs ago  Alcohol: rare  Recreational Drugs: denies  Immunizations: due for Shingrix and TDaP, otherwise utd    Patient Care Team:  Laura Momin DO as PCP - General  Laura Momin DO as PCP - [employer] PCP     Review of Systems   Constitutional:  Negative for chills and fever.   Respiratory:  Negative for cough and shortness of breath.    Cardiovascular:  Negative for chest pain and palpitations.   Gastrointestinal:  Negative for abdominal pain, constipation, diarrhea, nausea and vomiting.   Genitourinary:  Negative for dysuria.   Skin:  Negative for rash.   Psychiatric/Behavioral:  Negative for dysphoric mood. The patient is not nervous/anxious.      Objective   Vitals:  /84   Pulse 86   Ht 1.676 m (5' 6\")   Wt 73.3 kg (161 lb 9.6 oz)   BMI 26.08 kg/m²       Physical Exam  Constitutional:       Appearance: Normal appearance.   HENT:      Head: Normocephalic and atraumatic.     Eyes:      General: No scleral icterus.     " Conjunctiva/sclera: Conjunctivae normal.       Cardiovascular:      Rate and Rhythm: Normal rate and regular rhythm.      Heart sounds: No murmur heard.  Pulmonary:      Effort: Pulmonary effort is normal. No respiratory distress.      Breath sounds: Normal breath sounds.     Musculoskeletal:         General: No swelling. Normal range of motion.      Cervical back: Normal range of motion and neck supple.     Skin:     General: Skin is warm and dry.      Findings: No rash.     Neurological:      General: No focal deficit present.      Mental Status: She is alert.     Psychiatric:         Mood and Affect: Mood normal.         Behavior: Behavior normal.       Assessment & Plan  Subclinical hypothyroidism  - Stable and asymptomatic  - No indication for treatment  - We will continue to monitor  Orders:    Follow Up In Primary Care - Medicare Annual    Follow Up In Primary Care - [facility]; Future    TSH with reflex to Free T4 if abnormal; Future    Dyslipidemia  - Dietary modifications and recommendation for 150 minutes of moderate-intensity exercise per week reviewed.   - She declines rx tx  - We will continue to monitor   Orders:    Follow Up In Primary Care - Medicare Annual    Follow Up In Primary Care - Established; Future    Lipid Panel; Future    Routine general medical examination at health care facility    Orders:    1 Year Follow Up In Primary Care - Wellness Exam; Future    Follow Up In Primary Care - [facility]; Future    Rash  - Managed on prn topical metronidazole  - Refill provided  Orders:    metroNIDAZOLE (Metrocream) 0.75 % cream; Apply topically 2 times a day. Apply twice daily to rosacea    Non-Hodgkin's lymphoma, unspecified body region, unspecified non-Hodgkin lymphoma type  - Continue close follow up with oncology as previously scheduled.          Follow up in 1yr for recheck, sooner if needed. Labs prior to appt.

## 2025-07-24 NOTE — ASSESSMENT & PLAN NOTE
- Managed on prn topical metronidazole  - Refill provided  Orders:    metroNIDAZOLE (Metrocream) 0.75 % cream; Apply topically 2 times a day. Apply twice daily to rosacea

## 2025-07-24 NOTE — ASSESSMENT & PLAN NOTE
- Stable and asymptomatic  - No indication for treatment  - We will continue to monitor  Orders:    Follow Up In Primary Care - Medicare Annual    Follow Up In Primary Care - Established; Future    TSH with reflex to Free T4 if abnormal; Future

## 2025-07-27 ASSESSMENT — ACTIVITIES OF DAILY LIVING (ADL)
MANAGING_FINANCES: INDEPENDENT
TAKING_MEDICATION: INDEPENDENT
DRESSING: INDEPENDENT
GROCERY_SHOPPING: INDEPENDENT
DOING_HOUSEWORK: INDEPENDENT
BATHING: INDEPENDENT

## 2025-07-27 ASSESSMENT — ENCOUNTER SYMPTOMS
CONSTIPATION: 0
ABDOMINAL PAIN: 0
FEVER: 0
DYSPHORIC MOOD: 0
VOMITING: 0
NAUSEA: 0
SHORTNESS OF BREATH: 0
DIARRHEA: 0
CHILLS: 0
NERVOUS/ANXIOUS: 0
DYSURIA: 0
PALPITATIONS: 0
COUGH: 0

## 2026-07-27 ENCOUNTER — APPOINTMENT (OUTPATIENT)
Dept: PRIMARY CARE | Facility: CLINIC | Age: 66
End: 2026-07-27
Payer: MEDICARE